# Patient Record
Sex: FEMALE | Race: WHITE | URBAN - METROPOLITAN AREA
[De-identification: names, ages, dates, MRNs, and addresses within clinical notes are randomized per-mention and may not be internally consistent; named-entity substitution may affect disease eponyms.]

---

## 2022-07-18 ENCOUNTER — OFFICE VISIT (OUTPATIENT)
Dept: URBAN - METROPOLITAN AREA CLINIC 48 | Facility: CLINIC | Age: 67
End: 2022-07-18
Payer: MEDICARE

## 2022-07-18 DIAGNOSIS — H25.13 AGE-RELATED NUCLEAR CATARACT, BILATERAL: Primary | ICD-10-CM

## 2022-07-18 DIAGNOSIS — H17.9 CORNEAL SCAR: ICD-10-CM

## 2022-07-18 PROCEDURE — 99204 OFFICE O/P NEW MOD 45 MIN: CPT | Performed by: OPHTHALMOLOGY

## 2022-07-18 ASSESSMENT — INTRAOCULAR PRESSURE
OD: 21
OS: 22

## 2022-07-18 ASSESSMENT — VISUAL ACUITY
OD: 20/25
OS: 20/30

## 2022-07-18 ASSESSMENT — KERATOMETRY
OS: 42.88
OD: 42.50

## 2022-07-18 NOTE — IMPRESSION/PLAN
Impression: Corneal scar: H17.9. Right Eye Plan: Anterior stromal scar due to Ivana 88. NO treatment needed, monitor Recommend AFT PRN OU

## 2022-07-18 NOTE — IMPRESSION/PLAN
Impression: Age-related nuclear cataract, bilateral: H25.13. Plan: Cataracts account for the patient's complaints, along with stromal scar in OD. No treatment currently recommended. The patient will monitor vision changes and contact us with any decrease in vision. Recommend new glasses prescription rather than proceeding with surgery. 

RTC PRN